# Patient Record
Sex: MALE | Race: WHITE | URBAN - METROPOLITAN AREA
[De-identification: names, ages, dates, MRNs, and addresses within clinical notes are randomized per-mention and may not be internally consistent; named-entity substitution may affect disease eponyms.]

---

## 2020-05-15 ENCOUNTER — NEW PATIENT (OUTPATIENT)
Dept: URBAN - METROPOLITAN AREA CLINIC 51 | Facility: CLINIC | Age: 70
End: 2020-05-15
Payer: MEDICARE

## 2020-05-15 DIAGNOSIS — H40.013 OPEN ANGLE WITH BORDERLINE FINDINGS, LOW RISK, BILATERAL: ICD-10-CM

## 2020-05-15 DIAGNOSIS — H51.11 CONVERGENCE INSUFFICIENCY: ICD-10-CM

## 2020-05-15 DIAGNOSIS — H25.13 AGE-RELATED NUCLEAR CATARACT, BILATERAL: Primary | ICD-10-CM

## 2020-05-15 PROCEDURE — 92133 CPTRZD OPH DX IMG PST SGM ON: CPT | Performed by: OPTOMETRIST

## 2020-05-15 PROCEDURE — 92004 COMPRE OPH EXAM NEW PT 1/>: CPT | Performed by: OPTOMETRIST

## 2020-05-15 ASSESSMENT — KERATOMETRY
OS: 44.27
OD: 43.92

## 2020-05-15 ASSESSMENT — VISUAL ACUITY
OD: 20/20
OS: 20/20

## 2020-05-15 ASSESSMENT — INTRAOCULAR PRESSURE
OD: 18
OS: 19

## 2020-06-03 ENCOUNTER — FOLLOW UP ESTABLISHED (OUTPATIENT)
Dept: URBAN - METROPOLITAN AREA CLINIC 51 | Facility: CLINIC | Age: 70
End: 2020-06-03
Payer: MEDICARE

## 2020-06-03 PROCEDURE — 92012 INTRM OPH EXAM EST PATIENT: CPT | Performed by: OPTOMETRIST

## 2020-06-03 PROCEDURE — 76514 ECHO EXAM OF EYE THICKNESS: CPT | Performed by: OPTOMETRIST

## 2020-06-03 PROCEDURE — 92083 EXTENDED VISUAL FIELD XM: CPT | Performed by: OPTOMETRIST

## 2020-06-03 PROCEDURE — 92133 CPTRZD OPH DX IMG PST SGM ON: CPT | Performed by: OPTOMETRIST

## 2020-06-03 ASSESSMENT — INTRAOCULAR PRESSURE
OS: 13
OD: 13

## 2020-12-08 ENCOUNTER — FOLLOW UP ESTABLISHED (OUTPATIENT)
Dept: URBAN - METROPOLITAN AREA CLINIC 51 | Facility: CLINIC | Age: 70
End: 2020-12-08
Payer: MEDICARE

## 2020-12-08 PROCEDURE — 92133 CPTRZD OPH DX IMG PST SGM ON: CPT | Performed by: OPTOMETRIST

## 2020-12-08 PROCEDURE — 92083 EXTENDED VISUAL FIELD XM: CPT | Performed by: OPTOMETRIST

## 2020-12-08 PROCEDURE — 92012 INTRM OPH EXAM EST PATIENT: CPT | Performed by: OPTOMETRIST

## 2020-12-08 ASSESSMENT — INTRAOCULAR PRESSURE
OS: 14
OD: 15

## 2020-12-22 ENCOUNTER — FOLLOW UP ESTABLISHED (OUTPATIENT)
Dept: URBAN - METROPOLITAN AREA CLINIC 51 | Facility: CLINIC | Age: 70
End: 2020-12-22
Payer: MEDICARE

## 2020-12-22 DIAGNOSIS — H52.4 PRESBYOPIA: Primary | ICD-10-CM

## 2020-12-22 PROCEDURE — 92012 INTRM OPH EXAM EST PATIENT: CPT | Performed by: OPTOMETRIST

## 2020-12-22 PROCEDURE — 92015 DETERMINE REFRACTIVE STATE: CPT | Performed by: OPTOMETRIST

## 2022-01-10 ENCOUNTER — OFFICE VISIT (OUTPATIENT)
Dept: URBAN - METROPOLITAN AREA CLINIC 51 | Facility: CLINIC | Age: 72
End: 2022-01-10
Payer: MEDICARE

## 2022-01-10 DIAGNOSIS — H04.123 TEAR FILM INSUFFICIENCY OF BILATERAL LACRIMAL GLANDS: ICD-10-CM

## 2022-01-10 DIAGNOSIS — H43.313 VITREOUS MEMBRANES AND STRANDS, BILATERAL: ICD-10-CM

## 2022-01-10 DIAGNOSIS — H25.813 COMBINED FORMS OF AGE-RELATED CATARACT, BILATERAL: Primary | ICD-10-CM

## 2022-01-10 PROCEDURE — 92133 CPTRZD OPH DX IMG PST SGM ON: CPT | Performed by: OPTOMETRIST

## 2022-01-10 PROCEDURE — 99214 OFFICE O/P EST MOD 30 MIN: CPT | Performed by: OPTOMETRIST

## 2022-01-10 ASSESSMENT — VISUAL ACUITY
OS: 20/20
OD: 20/20

## 2022-01-10 ASSESSMENT — INTRAOCULAR PRESSURE
OS: 14
OD: 14

## 2022-01-10 NOTE — IMPRESSION/PLAN
Impression: Open angle with borderline findings, low risk, bilateral
* FOHx: maybe my mother. *IOPs today 14mmHg OD and 14mmHg OS without medication *Tmax: 18/19 *HVF unreliable , no glc defects (12/08/2020) *OCT RNFL (01/10/2022) OD: 78um ; abnormal superior thinning OS: 74um ; abnormal superior/temporal/inferior thinning *OCT RNFL abnormal , gca abnormal OU (12/08/2020) Plan: Based on [facility] appearance. Pt discussion regarding diagnosis. Exp'd that it is a symptomless disease. Return to clinic for work up including HVF24-2, IOP recheck, and Pachymetry next available. Will review test findings and treatment plan at follow up visit. No gtts given at this time.

## 2022-01-10 NOTE — IMPRESSION/PLAN
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 Patient to RTC immediately if notice sudden onset or worsening flashing lights, floaters, or a curtain over vision.

## 2022-02-09 ENCOUNTER — OFFICE VISIT (OUTPATIENT)
Dept: URBAN - METROPOLITAN AREA CLINIC 51 | Facility: CLINIC | Age: 72
End: 2022-02-09
Payer: MEDICARE

## 2022-02-09 PROCEDURE — 99213 OFFICE O/P EST LOW 20 MIN: CPT | Performed by: OPTOMETRIST

## 2022-02-09 PROCEDURE — 92083 EXTENDED VISUAL FIELD XM: CPT | Performed by: OPTOMETRIST

## 2022-02-09 PROCEDURE — 76514 ECHO EXAM OF EYE THICKNESS: CPT | Performed by: OPTOMETRIST

## 2022-02-09 ASSESSMENT — INTRAOCULAR PRESSURE
OS: 16
OD: 16

## 2022-02-09 NOTE — IMPRESSION/PLAN
Impression: Open angle with borderline findings, low risk, bilateral
*FOHx: maybe my mother. *IOPs today 16mmHg OD and 16mmHg OS without medication *Tmax: 18/19 *Pachymetry (02/09/2022) 527/875 *HVF 24-*2 (02/09/2022) OD: full OS: non specific defect *HVF unreliable , no glc defects (12/08/2020) *OCT RNFL (01/10/2022) OD: 78um ; abnormal superior thinning OS: 74um ; abnormal superior/temporal/inferior thinning *OCT RNFL abnormal , gca abnormal OU (12/08/2020) Plan: I counseled the patient regarding the following: Glaucoma suspects do not usually need to be treated but do need close follow up monitoring for early signs of glaucoma damage. Some glaucoma suspects require treatment, and the same medical options for glaucoma eye drops and pills are utilized. Laser procedures and intraocular surgery are usually reserved for patients with uncontrolled glaucoma. Expectations: Glaucoma is usually diagnosed when the following three criteria are present: elevated intraocular pressure, optic nerve damage, and visual field loss. When some of these criteria are missing, it can be challenging to make the diagnosis of glaucoma. Glaucoma suspects may also include individuals with other risk factors such as a family history of glaucoma, temporary elevations of IO when taking steroid medications, pseudoexfoliation syndrome, pigmentary dispersion syndrome, thin central corneal thickness, and optic disc hemorrhages. Check IOP, OCT RNFL and HVF annually at [facility] [place].

## 2023-02-02 ENCOUNTER — OFFICE VISIT (OUTPATIENT)
Dept: URBAN - METROPOLITAN AREA CLINIC 43 | Facility: CLINIC | Age: 73
End: 2023-02-02
Payer: MEDICARE

## 2023-02-02 DIAGNOSIS — H40.013 OPEN ANGLE WITH BORDERLINE FINDINGS, LOW RISK, BILATERAL: Primary | ICD-10-CM

## 2023-02-02 DIAGNOSIS — H25.813 COMBINED FORMS OF AGE-RELATED CATARACT, BILATERAL: ICD-10-CM

## 2023-02-02 DIAGNOSIS — H04.123 TEAR FILM INSUFFICIENCY OF BILATERAL LACRIMAL GLANDS: ICD-10-CM

## 2023-02-02 DIAGNOSIS — H43.813 VITREOUS DEGENERATION, BILATERAL: ICD-10-CM

## 2023-02-02 PROCEDURE — 92134 CPTRZ OPH DX IMG PST SGM RTA: CPT | Performed by: OPTOMETRIST

## 2023-02-02 PROCEDURE — 92133 CPTRZD OPH DX IMG PST SGM ON: CPT | Performed by: OPTOMETRIST

## 2023-02-02 PROCEDURE — 92014 COMPRE OPH EXAM EST PT 1/>: CPT | Performed by: OPTOMETRIST

## 2023-02-02 ASSESSMENT — INTRAOCULAR PRESSURE
OS: 19
OD: 18

## 2023-02-02 ASSESSMENT — VISUAL ACUITY
OS: 20/20
OD: 20/20

## 2023-02-02 ASSESSMENT — KERATOMETRY
OS: 44.13
OD: 43.50

## 2023-02-02 NOTE — IMPRESSION/PLAN
Impression: Vitreous degeneration, bilateral: H43.813. Plan: Retina is attached 360 degrees with no signs of any retinal holes, tears, or detachments observed on today's dilated examination. Pt to RTC ASAP if increase in floaters, flashes, or curtain or veil taking away vision.

## 2023-02-02 NOTE — IMPRESSION/PLAN
Impression: Combined forms of age-related cataract, bilateral: H25.813. Plan: Educated patient on condition and findings. Cataracts are likely having some affect on patients vision, however, not significant enough to warrant surgical intervention at this time. Patient will monitor vision closely and contact our office with any changes/ worsening in vision. Will re-evaluate on return visit.

## 2023-02-02 NOTE — IMPRESSION/PLAN
Impression: Tear film insufficiency of bilateral lacrimal glands: H04.123. Plan: AT's as needed for comfort.

## 2023-02-02 NOTE — IMPRESSION/PLAN
Impression: Open angle with borderline findings, low risk, bilateral
*FOHx: maybe my mother. *Pachymetry (02/09/2022) 765/922 *HVF 24-*2 (02/09/2022) OD: full ; OS: non specific defect *RNFL OCT (02/02/2023) OD: WNL ; OS: bdl IT
*GCA OCT (02/02/2023) OD: bdl ST and IT ; OS: bdl overall Plan: Large ONH appearance Reviewed today's findings with patient. Diagnostic testing remains stable and shows no signs of glaucoma at this time. No treatment necessary. 

RTC in 1 year for CE + RNFL/GCA OCT + 24-2 HVF